# Patient Record
Sex: FEMALE | Race: WHITE | Employment: OTHER | URBAN - METROPOLITAN AREA
[De-identification: names, ages, dates, MRNs, and addresses within clinical notes are randomized per-mention and may not be internally consistent; named-entity substitution may affect disease eponyms.]

---

## 2018-12-14 RX ORDER — DIPHENOXYLATE HCL/ATROPINE 2.5-.025MG
1 TABLET ORAL EVERY 4 HOURS PRN
Status: ON HOLD | COMMUNITY
End: 2018-12-17

## 2018-12-14 RX ORDER — FLUTICASONE PROPIONATE 50 MCG
2 SPRAY, SUSPENSION (ML) NASAL DAILY PRN
Status: ON HOLD | COMMUNITY
End: 2019-09-03

## 2018-12-17 ENCOUNTER — ANESTHESIA (OUTPATIENT)
Dept: SURGERY | Facility: CLINIC | Age: 68
DRG: 742 | End: 2018-12-17
Payer: MEDICARE

## 2018-12-17 ENCOUNTER — HOSPITAL ENCOUNTER (INPATIENT)
Facility: CLINIC | Age: 68
LOS: 1 days | Discharge: HOME | DRG: 742 | End: 2018-12-18
Attending: COLON & RECTAL SURGERY | Admitting: COLON & RECTAL SURGERY
Payer: MEDICARE

## 2018-12-17 ENCOUNTER — ANESTHESIA EVENT (OUTPATIENT)
Dept: SURGERY | Facility: CLINIC | Age: 68
DRG: 742 | End: 2018-12-17
Payer: MEDICARE

## 2018-12-17 DIAGNOSIS — K46.9 ENTEROCELE: Primary | ICD-10-CM

## 2018-12-17 LAB
ABO + RH BLD: NORMAL
ABO + RH BLD: NORMAL
ANION GAP SERPL CALCULATED.3IONS-SCNC: 11 MMOL/L (ref 3–14)
BLD GP AB SCN SERPL QL: NORMAL
BLOOD BANK CMNT PATIENT-IMP: NORMAL
BUN SERPL-MCNC: 20 MG/DL (ref 7–30)
CALCIUM SERPL-MCNC: 9.2 MG/DL (ref 8.5–10.1)
CHLORIDE SERPL-SCNC: 98 MMOL/L (ref 94–109)
CO2 SERPL-SCNC: 23 MMOL/L (ref 20–32)
CREAT SERPL-MCNC: 0.72 MG/DL (ref 0.52–1.04)
ERYTHROCYTE [DISTWIDTH] IN BLOOD BY AUTOMATED COUNT: 12.7 % (ref 10–15)
GFR SERPL CREATININE-BSD FRML MDRD: 81 ML/MIN/1.7M2
GLUCOSE SERPL-MCNC: 82 MG/DL (ref 70–99)
HCT VFR BLD AUTO: 41 % (ref 35–47)
HGB BLD-MCNC: 14.3 G/DL (ref 11.7–15.7)
MCH RBC QN AUTO: 30.3 PG (ref 26.5–33)
MCHC RBC AUTO-ENTMCNC: 34.9 G/DL (ref 31.5–36.5)
MCV RBC AUTO: 87 FL (ref 78–100)
PLATELET # BLD AUTO: 351 10E9/L (ref 150–450)
POTASSIUM SERPL-SCNC: 3.4 MMOL/L (ref 3.4–5.3)
RBC # BLD AUTO: 4.72 10E12/L (ref 3.8–5.2)
SODIUM SERPL-SCNC: 132 MMOL/L (ref 133–144)
SPECIMEN EXP DATE BLD: NORMAL
WBC # BLD AUTO: 4.9 10E9/L (ref 4–11)

## 2018-12-17 PROCEDURE — 36415 COLL VENOUS BLD VENIPUNCTURE: CPT | Performed by: SURGERY

## 2018-12-17 PROCEDURE — 25000128 H RX IP 250 OP 636: Performed by: SURGERY

## 2018-12-17 PROCEDURE — 71000012 ZZH RECOVERY PHASE 1 LEVEL 1 FIRST HR: Performed by: COLON & RECTAL SURGERY

## 2018-12-17 PROCEDURE — 85027 COMPLETE CBC AUTOMATED: CPT | Performed by: COLON & RECTAL SURGERY

## 2018-12-17 PROCEDURE — 88305 TISSUE EXAM BY PATHOLOGIST: CPT | Performed by: OBSTETRICS & GYNECOLOGY

## 2018-12-17 PROCEDURE — 40000170 ZZH STATISTIC PRE-PROCEDURE ASSESSMENT II: Performed by: COLON & RECTAL SURGERY

## 2018-12-17 PROCEDURE — 0TJB8ZZ INSPECTION OF BLADDER, VIA NATURAL OR ARTIFICIAL OPENING ENDOSCOPIC: ICD-10-PCS | Performed by: OBSTETRICS & GYNECOLOGY

## 2018-12-17 PROCEDURE — 0JUC3JZ SUPPLEMENT OF PELVIC REGION SUBCUTANEOUS TISSUE AND FASCIA WITH SYNTHETIC SUBSTITUTE, PERCUTANEOUS APPROACH: ICD-10-PCS | Performed by: COLON & RECTAL SURGERY

## 2018-12-17 PROCEDURE — 86850 RBC ANTIBODY SCREEN: CPT | Performed by: SURGERY

## 2018-12-17 PROCEDURE — 12000007 ZZH R&B INTERMEDIATE

## 2018-12-17 PROCEDURE — 86900 BLOOD TYPING SEROLOGIC ABO: CPT | Performed by: SURGERY

## 2018-12-17 PROCEDURE — 27210794 ZZH OR GENERAL SUPPLY STERILE: Performed by: COLON & RECTAL SURGERY

## 2018-12-17 PROCEDURE — 25000128 H RX IP 250 OP 636: Performed by: NURSE ANESTHETIST, CERTIFIED REGISTERED

## 2018-12-17 PROCEDURE — 25000125 ZZHC RX 250: Performed by: NURSE ANESTHETIST, CERTIFIED REGISTERED

## 2018-12-17 PROCEDURE — A9270 NON-COVERED ITEM OR SERVICE: HCPCS | Mod: GY | Performed by: COLON & RECTAL SURGERY

## 2018-12-17 PROCEDURE — 37000009 ZZH ANESTHESIA TECHNICAL FEE, EACH ADDTL 15 MIN: Performed by: COLON & RECTAL SURGERY

## 2018-12-17 PROCEDURE — 27210995 ZZH RX 272: Performed by: COLON & RECTAL SURGERY

## 2018-12-17 PROCEDURE — C1781 MESH (IMPLANTABLE): HCPCS | Performed by: COLON & RECTAL SURGERY

## 2018-12-17 PROCEDURE — 86901 BLOOD TYPING SEROLOGIC RH(D): CPT | Performed by: SURGERY

## 2018-12-17 PROCEDURE — 80048 BASIC METABOLIC PNL TOTAL CA: CPT | Performed by: COLON & RECTAL SURGERY

## 2018-12-17 PROCEDURE — 36000087 ZZH SURGERY LEVEL 8 EA 15 ADDTL MIN: Performed by: COLON & RECTAL SURGERY

## 2018-12-17 PROCEDURE — 25800025 ZZH RX 258: Performed by: COLON & RECTAL SURGERY

## 2018-12-17 PROCEDURE — 25000128 H RX IP 250 OP 636: Performed by: COLON & RECTAL SURGERY

## 2018-12-17 PROCEDURE — 37000008 ZZH ANESTHESIA TECHNICAL FEE, 1ST 30 MIN: Performed by: COLON & RECTAL SURGERY

## 2018-12-17 PROCEDURE — 25000125 ZZHC RX 250: Performed by: SURGERY

## 2018-12-17 PROCEDURE — 25000125 ZZHC RX 250: Performed by: COLON & RECTAL SURGERY

## 2018-12-17 PROCEDURE — 36000085 ZZH SURGERY LEVEL 8 1ST 30 MIN: Performed by: COLON & RECTAL SURGERY

## 2018-12-17 PROCEDURE — 0UT24ZZ RESECTION OF BILATERAL OVARIES, PERCUTANEOUS ENDOSCOPIC APPROACH: ICD-10-PCS | Performed by: OBSTETRICS & GYNECOLOGY

## 2018-12-17 PROCEDURE — 88305 TISSUE EXAM BY PATHOLOGIST: CPT | Mod: 26 | Performed by: OBSTETRICS & GYNECOLOGY

## 2018-12-17 PROCEDURE — 8E0W4CZ ROBOTIC ASSISTED PROCEDURE OF TRUNK REGION, PERCUTANEOUS ENDOSCOPIC APPROACH: ICD-10-PCS | Performed by: COLON & RECTAL SURGERY

## 2018-12-17 PROCEDURE — 0DUP4JZ SUPPLEMENT RECTUM WITH SYNTHETIC SUBSTITUTE, PERCUTANEOUS ENDOSCOPIC APPROACH: ICD-10-PCS | Performed by: COLON & RECTAL SURGERY

## 2018-12-17 PROCEDURE — 25000132 ZZH RX MED GY IP 250 OP 250 PS 637: Mod: GY | Performed by: COLON & RECTAL SURGERY

## 2018-12-17 PROCEDURE — 25000566 ZZH SEVOFLURANE, EA 15 MIN: Performed by: COLON & RECTAL SURGERY

## 2018-12-17 PROCEDURE — 0USG4ZZ REPOSITION VAGINA, PERCUTANEOUS ENDOSCOPIC APPROACH: ICD-10-PCS | Performed by: OBSTETRICS & GYNECOLOGY

## 2018-12-17 PROCEDURE — 0UT74ZZ RESECTION OF BILATERAL FALLOPIAN TUBES, PERCUTANEOUS ENDOSCOPIC APPROACH: ICD-10-PCS | Performed by: OBSTETRICS & GYNECOLOGY

## 2018-12-17 DEVICE — MESH SLING ARTISYN SACROCOLPOPEXY ARTY5L: Type: IMPLANTABLE DEVICE | Site: PELVIS | Status: FUNCTIONAL

## 2018-12-17 RX ORDER — ONDANSETRON 2 MG/ML
4 INJECTION INTRAMUSCULAR; INTRAVENOUS EVERY 6 HOURS PRN
Status: DISCONTINUED | OUTPATIENT
Start: 2018-12-17 | End: 2018-12-18 | Stop reason: HOSPADM

## 2018-12-17 RX ORDER — HYDRALAZINE HYDROCHLORIDE 20 MG/ML
10 INJECTION INTRAMUSCULAR; INTRAVENOUS EVERY 4 HOURS PRN
Status: DISCONTINUED | OUTPATIENT
Start: 2018-12-17 | End: 2018-12-18 | Stop reason: HOSPADM

## 2018-12-17 RX ORDER — METOPROLOL TARTRATE 1 MG/ML
1-2 INJECTION, SOLUTION INTRAVENOUS EVERY 5 MIN PRN
Status: DISCONTINUED | OUTPATIENT
Start: 2018-12-17 | End: 2018-12-17 | Stop reason: HOSPADM

## 2018-12-17 RX ORDER — FENTANYL CITRATE 50 UG/ML
INJECTION, SOLUTION INTRAMUSCULAR; INTRAVENOUS PRN
Status: DISCONTINUED | OUTPATIENT
Start: 2018-12-17 | End: 2018-12-17

## 2018-12-17 RX ORDER — NALOXONE HYDROCHLORIDE 0.4 MG/ML
.1-.4 INJECTION, SOLUTION INTRAMUSCULAR; INTRAVENOUS; SUBCUTANEOUS
Status: DISCONTINUED | OUTPATIENT
Start: 2018-12-17 | End: 2018-12-17

## 2018-12-17 RX ORDER — GABAPENTIN 600 MG/1
600 TABLET ORAL 2 TIMES DAILY
Status: DISCONTINUED | OUTPATIENT
Start: 2018-12-17 | End: 2018-12-18 | Stop reason: HOSPADM

## 2018-12-17 RX ORDER — ACETAMINOPHEN 325 MG/1
975 TABLET ORAL EVERY 8 HOURS
Status: DISCONTINUED | OUTPATIENT
Start: 2018-12-17 | End: 2018-12-18 | Stop reason: HOSPADM

## 2018-12-17 RX ORDER — NEOSTIGMINE METHYLSULFATE 1 MG/ML
VIAL (ML) INJECTION PRN
Status: DISCONTINUED | OUTPATIENT
Start: 2018-12-17 | End: 2018-12-17

## 2018-12-17 RX ORDER — CEFAZOLIN SODIUM 1 G/3ML
1 INJECTION, POWDER, FOR SOLUTION INTRAMUSCULAR; INTRAVENOUS SEE ADMIN INSTRUCTIONS
Status: DISCONTINUED | OUTPATIENT
Start: 2018-12-17 | End: 2018-12-17 | Stop reason: ALTCHOICE

## 2018-12-17 RX ORDER — DEXTROSE MONOHYDRATE, SODIUM CHLORIDE, AND POTASSIUM CHLORIDE 50; 1.49; 4.5 G/1000ML; G/1000ML; G/1000ML
INJECTION, SOLUTION INTRAVENOUS CONTINUOUS
Status: DISCONTINUED | OUTPATIENT
Start: 2018-12-17 | End: 2018-12-18

## 2018-12-17 RX ORDER — ONDANSETRON 2 MG/ML
INJECTION INTRAMUSCULAR; INTRAVENOUS PRN
Status: DISCONTINUED | OUTPATIENT
Start: 2018-12-17 | End: 2018-12-17

## 2018-12-17 RX ORDER — PIPERACILLIN SODIUM, TAZOBACTAM SODIUM 3; .375 G/15ML; G/15ML
3.38 INJECTION, POWDER, LYOPHILIZED, FOR SOLUTION INTRAVENOUS
Status: COMPLETED | OUTPATIENT
Start: 2018-12-17 | End: 2018-12-17

## 2018-12-17 RX ORDER — MAGNESIUM HYDROXIDE 1200 MG/15ML
LIQUID ORAL PRN
Status: DISCONTINUED | OUTPATIENT
Start: 2018-12-17 | End: 2018-12-17 | Stop reason: HOSPADM

## 2018-12-17 RX ORDER — ONDANSETRON 2 MG/ML
4 INJECTION INTRAMUSCULAR; INTRAVENOUS EVERY 30 MIN PRN
Status: DISCONTINUED | OUTPATIENT
Start: 2018-12-17 | End: 2018-12-17 | Stop reason: HOSPADM

## 2018-12-17 RX ORDER — MEPERIDINE HYDROCHLORIDE 25 MG/ML
12.5 INJECTION INTRAMUSCULAR; INTRAVENOUS; SUBCUTANEOUS EVERY 5 MIN PRN
Status: DISCONTINUED | OUTPATIENT
Start: 2018-12-17 | End: 2018-12-17 | Stop reason: HOSPADM

## 2018-12-17 RX ORDER — HYDROMORPHONE HYDROCHLORIDE 1 MG/ML
.3-.5 INJECTION, SOLUTION INTRAMUSCULAR; INTRAVENOUS; SUBCUTANEOUS
Status: DISCONTINUED | OUTPATIENT
Start: 2018-12-17 | End: 2018-12-18 | Stop reason: HOSPADM

## 2018-12-17 RX ORDER — CEFAZOLIN SODIUM 1 G/3ML
1 INJECTION, POWDER, FOR SOLUTION INTRAMUSCULAR; INTRAVENOUS EVERY 8 HOURS
Status: COMPLETED | OUTPATIENT
Start: 2018-12-17 | End: 2018-12-18

## 2018-12-17 RX ORDER — SODIUM CHLORIDE, SODIUM LACTATE, POTASSIUM CHLORIDE, CALCIUM CHLORIDE 600; 310; 30; 20 MG/100ML; MG/100ML; MG/100ML; MG/100ML
INJECTION, SOLUTION INTRAVENOUS CONTINUOUS
Status: DISCONTINUED | OUTPATIENT
Start: 2018-12-17 | End: 2018-12-17 | Stop reason: HOSPADM

## 2018-12-17 RX ORDER — DEXAMETHASONE SODIUM PHOSPHATE 4 MG/ML
INJECTION, SOLUTION INTRA-ARTICULAR; INTRALESIONAL; INTRAMUSCULAR; INTRAVENOUS; SOFT TISSUE PRN
Status: DISCONTINUED | OUTPATIENT
Start: 2018-12-17 | End: 2018-12-17

## 2018-12-17 RX ORDER — ACETAMINOPHEN 325 MG/1
975 TABLET ORAL ONCE
Status: COMPLETED | OUTPATIENT
Start: 2018-12-17 | End: 2018-12-17

## 2018-12-17 RX ORDER — KETOROLAC TROMETHAMINE 15 MG/ML
15 INJECTION, SOLUTION INTRAMUSCULAR; INTRAVENOUS EVERY 6 HOURS
Status: DISCONTINUED | OUTPATIENT
Start: 2018-12-17 | End: 2018-12-18 | Stop reason: HOSPADM

## 2018-12-17 RX ORDER — NALOXONE HYDROCHLORIDE 0.4 MG/ML
.1-.4 INJECTION, SOLUTION INTRAMUSCULAR; INTRAVENOUS; SUBCUTANEOUS
Status: DISCONTINUED | OUTPATIENT
Start: 2018-12-17 | End: 2018-12-18 | Stop reason: HOSPADM

## 2018-12-17 RX ORDER — ALBUTEROL SULFATE 90 UG/1
2 AEROSOL, METERED RESPIRATORY (INHALATION) DAILY PRN
Status: DISCONTINUED | OUTPATIENT
Start: 2018-12-17 | End: 2018-12-18 | Stop reason: HOSPADM

## 2018-12-17 RX ORDER — VECURONIUM BROMIDE 1 MG/ML
INJECTION, POWDER, LYOPHILIZED, FOR SOLUTION INTRAVENOUS PRN
Status: DISCONTINUED | OUTPATIENT
Start: 2018-12-17 | End: 2018-12-17

## 2018-12-17 RX ORDER — LIDOCAINE HYDROCHLORIDE 20 MG/ML
INJECTION, SOLUTION INFILTRATION; PERINEURAL PRN
Status: DISCONTINUED | OUTPATIENT
Start: 2018-12-17 | End: 2018-12-17

## 2018-12-17 RX ORDER — PROPOFOL 10 MG/ML
INJECTION, EMULSION INTRAVENOUS PRN
Status: DISCONTINUED | OUTPATIENT
Start: 2018-12-17 | End: 2018-12-17

## 2018-12-17 RX ORDER — FLUTICASONE PROPIONATE 50 MCG
2 SPRAY, SUSPENSION (ML) NASAL DAILY PRN
Status: DISCONTINUED | OUTPATIENT
Start: 2018-12-17 | End: 2018-12-18 | Stop reason: HOSPADM

## 2018-12-17 RX ORDER — LIDOCAINE 40 MG/G
CREAM TOPICAL
Status: DISCONTINUED | OUTPATIENT
Start: 2018-12-17 | End: 2018-12-18 | Stop reason: HOSPADM

## 2018-12-17 RX ORDER — PROPOFOL 10 MG/ML
INJECTION, EMULSION INTRAVENOUS CONTINUOUS PRN
Status: DISCONTINUED | OUTPATIENT
Start: 2018-12-17 | End: 2018-12-17

## 2018-12-17 RX ORDER — EPHEDRINE SULFATE 50 MG/ML
INJECTION, SOLUTION INTRAMUSCULAR; INTRAVENOUS; SUBCUTANEOUS PRN
Status: DISCONTINUED | OUTPATIENT
Start: 2018-12-17 | End: 2018-12-17

## 2018-12-17 RX ORDER — FENTANYL CITRATE 50 UG/ML
25-50 INJECTION, SOLUTION INTRAMUSCULAR; INTRAVENOUS
Status: DISCONTINUED | OUTPATIENT
Start: 2018-12-17 | End: 2018-12-17 | Stop reason: HOSPADM

## 2018-12-17 RX ORDER — HYDROMORPHONE HYDROCHLORIDE 1 MG/ML
.3-.5 INJECTION, SOLUTION INTRAMUSCULAR; INTRAVENOUS; SUBCUTANEOUS EVERY 5 MIN PRN
Status: DISCONTINUED | OUTPATIENT
Start: 2018-12-17 | End: 2018-12-17 | Stop reason: HOSPADM

## 2018-12-17 RX ORDER — GLYCOPYRROLATE 0.2 MG/ML
INJECTION, SOLUTION INTRAMUSCULAR; INTRAVENOUS PRN
Status: DISCONTINUED | OUTPATIENT
Start: 2018-12-17 | End: 2018-12-17

## 2018-12-17 RX ORDER — METOPROLOL TARTRATE 25 MG/1
25 TABLET, FILM COATED ORAL 2 TIMES DAILY
Status: DISCONTINUED | OUTPATIENT
Start: 2018-12-18 | End: 2018-12-18 | Stop reason: HOSPADM

## 2018-12-17 RX ORDER — CEFAZOLIN SODIUM 2 G/100ML
2 INJECTION, SOLUTION INTRAVENOUS
Status: DISCONTINUED | OUTPATIENT
Start: 2018-12-17 | End: 2018-12-17 | Stop reason: ALTCHOICE

## 2018-12-17 RX ORDER — BUPIVACAINE HYDROCHLORIDE AND EPINEPHRINE 5; 5 MG/ML; UG/ML
INJECTION, SOLUTION PERINEURAL PRN
Status: DISCONTINUED | OUTPATIENT
Start: 2018-12-17 | End: 2018-12-17 | Stop reason: HOSPADM

## 2018-12-17 RX ORDER — ONDANSETRON 4 MG/1
4 TABLET, ORALLY DISINTEGRATING ORAL EVERY 30 MIN PRN
Status: DISCONTINUED | OUTPATIENT
Start: 2018-12-17 | End: 2018-12-17 | Stop reason: HOSPADM

## 2018-12-17 RX ORDER — ALBUTEROL SULFATE 90 UG/1
2 AEROSOL, METERED RESPIRATORY (INHALATION) DAILY PRN
COMMUNITY

## 2018-12-17 RX ADMIN — FENTANYL CITRATE 50 MCG: 50 INJECTION, SOLUTION INTRAMUSCULAR; INTRAVENOUS at 17:00

## 2018-12-17 RX ADMIN — CEFAZOLIN SODIUM 1 G: 1 INJECTION, POWDER, FOR SOLUTION INTRAMUSCULAR; INTRAVENOUS at 21:36

## 2018-12-17 RX ADMIN — FENTANYL CITRATE 100 MCG: 50 INJECTION, SOLUTION INTRAMUSCULAR; INTRAVENOUS at 14:01

## 2018-12-17 RX ADMIN — PROPOFOL 50 MCG/KG/MIN: 10 INJECTION, EMULSION INTRAVENOUS at 14:04

## 2018-12-17 RX ADMIN — ONDANSETRON 4 MG: 2 INJECTION INTRAMUSCULAR; INTRAVENOUS at 15:55

## 2018-12-17 RX ADMIN — KETOROLAC TROMETHAMINE 15 MG: 15 INJECTION, SOLUTION INTRAMUSCULAR; INTRAVENOUS at 23:42

## 2018-12-17 RX ADMIN — LIDOCAINE HYDROCHLORIDE 0.2 ML: 10 INJECTION, SOLUTION EPIDURAL; INFILTRATION; INTRACAUDAL; PERINEURAL at 11:53

## 2018-12-17 RX ADMIN — MIDAZOLAM 2 MG: 1 INJECTION INTRAMUSCULAR; INTRAVENOUS at 13:54

## 2018-12-17 RX ADMIN — ROCURONIUM BROMIDE 50 MG: 10 INJECTION INTRAVENOUS at 14:01

## 2018-12-17 RX ADMIN — KETOROLAC TROMETHAMINE 15 MG: 15 INJECTION, SOLUTION INTRAMUSCULAR; INTRAVENOUS at 16:37

## 2018-12-17 RX ADMIN — ACETAMINOPHEN 975 MG: 325 TABLET, FILM COATED ORAL at 21:37

## 2018-12-17 RX ADMIN — PROPOFOL 150 MG: 10 INJECTION, EMULSION INTRAVENOUS at 14:01

## 2018-12-17 RX ADMIN — DEXMEDETOMIDINE HYDROCHLORIDE 10 MCG: 100 INJECTION, SOLUTION INTRAVENOUS at 14:46

## 2018-12-17 RX ADMIN — NEOSTIGMINE METHYLSULFATE 3.5 MG: 1 INJECTION, SOLUTION INTRAVENOUS at 16:08

## 2018-12-17 RX ADMIN — LIDOCAINE HYDROCHLORIDE 60 MG: 20 INJECTION, SOLUTION INFILTRATION; PERINEURAL at 14:01

## 2018-12-17 RX ADMIN — FENTANYL CITRATE 50 MCG: 50 INJECTION, SOLUTION INTRAMUSCULAR; INTRAVENOUS at 16:46

## 2018-12-17 RX ADMIN — Medication 0.5 MG: at 16:45

## 2018-12-17 RX ADMIN — SODIUM CHLORIDE, POTASSIUM CHLORIDE, SODIUM LACTATE AND CALCIUM CHLORIDE: 600; 310; 30; 20 INJECTION, SOLUTION INTRAVENOUS at 11:53

## 2018-12-17 RX ADMIN — POTASSIUM CHLORIDE, DEXTROSE MONOHYDRATE AND SODIUM CHLORIDE: 150; 5; 450 INJECTION, SOLUTION INTRAVENOUS at 18:09

## 2018-12-17 RX ADMIN — Medication 7.5 MG: at 14:10

## 2018-12-17 RX ADMIN — Medication 0.5 MG: at 16:37

## 2018-12-17 RX ADMIN — DEXAMETHASONE SODIUM PHOSPHATE 4 MG: 4 INJECTION, SOLUTION INTRA-ARTICULAR; INTRALESIONAL; INTRAMUSCULAR; INTRAVENOUS; SOFT TISSUE at 14:16

## 2018-12-17 RX ADMIN — GLYCOPYRROLATE 0.6 MG: 0.2 INJECTION, SOLUTION INTRAMUSCULAR; INTRAVENOUS at 16:08

## 2018-12-17 RX ADMIN — Medication 5 MG: at 14:22

## 2018-12-17 RX ADMIN — Medication 7.5 MG: at 14:05

## 2018-12-17 RX ADMIN — ACETAMINOPHEN 975 MG: 325 TABLET, FILM COATED ORAL at 11:47

## 2018-12-17 RX ADMIN — GABAPENTIN 600 MG: 600 TABLET, FILM COATED ORAL at 21:37

## 2018-12-17 RX ADMIN — SODIUM CHLORIDE, POTASSIUM CHLORIDE, SODIUM LACTATE AND CALCIUM CHLORIDE: 600; 310; 30; 20 INJECTION, SOLUTION INTRAVENOUS at 15:28

## 2018-12-17 RX ADMIN — FENTANYL CITRATE 50 MCG: 50 INJECTION, SOLUTION INTRAMUSCULAR; INTRAVENOUS at 14:35

## 2018-12-17 RX ADMIN — VECURONIUM BROMIDE 1 MG: 1 INJECTION, POWDER, LYOPHILIZED, FOR SOLUTION INTRAVENOUS at 15:15

## 2018-12-17 RX ADMIN — PIPERACILLIN SODIUM AND TAZOBACTAM SODIUM 3.38 G: 3; .375 INJECTION, POWDER, LYOPHILIZED, FOR SOLUTION INTRAVENOUS at 14:15

## 2018-12-17 ASSESSMENT — MIFFLIN-ST. JEOR: SCORE: 1291.11

## 2018-12-17 ASSESSMENT — ACTIVITIES OF DAILY LIVING (ADL): ADLS_ACUITY_SCORE: 11

## 2018-12-17 ASSESSMENT — ENCOUNTER SYMPTOMS: ORTHOPNEA: 0

## 2018-12-17 NOTE — BRIEF OP NOTE
[facility]    Brief Operative Note    Pre-operative diagnosis: PELVIC ORGAN PROLAPSE: rectal intussusception, enterocele, rectocele, cystocele  Post-operative diagnosis PELVIC ORGAN PROLAPSE: rectal intussusception, enterocele, rectocele, cystocele  Procedure:   1. Robotic ventral rectopexy  2. Robotic sacrocolpopexy  3. Robotic bilateral salpingectomy and oophorectomy  4. cystoscopy  Surgeon: Surgeon(s) and Role:  Panel 1:     * Kristina Martin MD - Primary     * Rebecca Bartlett PA-C - Assisting  Panel 2:     * Maurice Minor MD - Primary     * Kristina Craig PA-C - Assisting  Anesthesia: General   Estimated blood loss: 10 ml  Drains: None  Specimens:   ID Type Source Tests Collected by Time Destination   A :  Organ Fallopian Tube and Ovary, Right SURGICAL PATHOLOGY EXAM Maurice Minor MD 12/17/2018  2:43 PM    B :  Organ Fallopian Tube and Ovary, Left SURGICAL PATHOLOGY EXAM Maurice Minor MD 12/17/2018  2:47 PM      Findings:   rectal width 4.5 cm at the levators.  Complications: None.  Implants: mesh      Kristina Martin MD  [facility]  Pager:  [phone]  Phone: [phone]  Fax: [phone]            ADDENDUM:    PATIENT DATA  Indicate Y or N:  Home O2 No  Hemodialysis  No  Transplant patient  No  Cirrhosis  No  Steroids in last 30 days  No  Immunomodulators in last 30 days  No  Anticoagulation at time of surgery  No   List medication na  Prior abdominal surgery  Yes  Pelvic irradiation  No    Albumin within 30 days if known unknown   Hgb within 30 days if known    Hemoglobin   Date Value Ref Range Status   12/17/2018 14.3 11.7 - 15.7 g/dL Final   ]  Cr within 30 days if known    Creatinine   Date Value Ref Range Status   12/17/2018 0.72 0.52 - 1.04 mg/dL Final   ]  Body mass index is 23.72 kg/m .      OR DATA  Emergent  No   <24 hours  No   <1 week  No  Bowel Prep Yes  Antibiotics  Yes  DVT prophylaxis    Heparin  No   SCD   Yes   None  No  Drain  No  ASA (1,2,3,4) 2  OR time (min) 103  Stents  No  Transfuse >/= 2U  No  Anastomosis none

## 2018-12-17 NOTE — ANESTHESIA CARE TRANSFER NOTE
Patient: Henna Chaconverson    Procedure(s):  ROBOTIC ASSISTED XI VENTRAL RECTOPEXY  DAVINCI XI SACROCOLPOPEXY, CYSTOSCOPY, HYDRODISTENTION  DAVINCI XI BILATERAL SALPINGO-OOPHORECTOMY    Diagnosis: PELVIC ORGAN PROLAPSE  Diagnosis Additional Information: No value filed.    Anesthesia Type:   General, ETT     Note:  Airway :Face Mask  Patient transferred to:PACU  Handoff Report: Identifed the Patient, Identified the Reponsible Provider, Reviewed the pertinent medical history, Discussed the surgical course, Reviewed Intra-OP anesthesia mangement and issues during anesthesia, Set expectations for post-procedure period and Allowed opportunity for questions and acknowledgement of understanding      Vitals: (Last set prior to Anesthesia Care Transfer)    CRNA VITALS  12/17/2018 1548 - 12/17/2018 1623      12/17/2018             Pulse:  86    SpO2:  97 %    Resp Rate (set):  10                Electronically Signed By: QUINCY Chowdhury CRNA  December 17, 2018  4:23 PM

## 2018-12-17 NOTE — ANESTHESIA PREPROCEDURE EVALUATION
Anesthesia Pre-Procedure Evaluation    Patient: Henna Li   MRN: [id number] : 1950          Preoperative Diagnosis: PELVIC ORGAN PROLAPSE    Procedure(s):  ROBOTIC ASSISTED XI VENTRAL RECTOPEXY  DAVINCI XI SACROCOLPOPEXY, BILATERAL SALPINGO-OOPHORECTOMY, CYSTOCOPY HYDRODISTENTION, POSSIBLE ADVANTAGE FIT SLING,  DAVINCI XI SALPINGO-OOPHORECTOMY INCLUDING BILATERAL (SEE NOTE ON PANEL ONE)    Past Medical History:   Diagnosis Date     Allergic rhinitis      Arthritis      Depression      Dyspareunia, female      Hypertension      Internal intussusception of rectum (H)      Interstitial cystitis      Irritable bowel syndrome with diarrhea      Neuropathy of lower extremity, unspecified laterality     bilat.     Osteoarthritis      Osteopenia      Prolapse of vaginal wall      Rectocele      Spinal stenosis      Takotsubo cardiomyopathy 2013    resolved     Past Surgical History:   Procedure Laterality Date     BREAST SURGERY      augmentation     COSMETIC SURGERY  2017    facelift, blepharoplasty     ENT SURGERY      tonsilectomy     GYN SURGERY      perineoplasty, mesh placement for vaginal prolapse     GYN SURGERY      hysterectomy     ORTHOPEDIC SURGERY      neuroma,hammertoes     Procedure: Procedure(s):  ROBOTIC ASSISTED XI VENTRAL RECTOPEXY  DAVINCI XI SACROCOLPOPEXY, BILATERAL SALPINGO-OOPHORECTOMY, CYSTOCOPY HYDRODISTENTION, POSSIBLE ADVANTAGE FIT SLING,  DAVINCI XI SALPINGO-OOPHORECTOMY INCLUDING BILATERAL (SEE NOTE ON PANEL ONE)  Preop diagnosis: PELVIC ORGAN PROLAPSE    Allergies   Allergen Reactions     Azithromycin      gastrointestinal     Sulfa Drugs GI Disturbance     Syncope       Tetracyclines GI Disturbance     Past Medical History:   Diagnosis Date     Allergic rhinitis      Arthritis      Depression      Dyspareunia, female      Hypertension      Internal intussusception of rectum (H)      Interstitial cystitis      Irritable bowel syndrome with diarrhea      Neuropathy of lower  extremity, unspecified laterality     bilat.     Osteoarthritis      Osteopenia      Prolapse of vaginal wall      Rectocele      Spinal stenosis      Takotsubo cardiomyopathy 2013    resolved     Past Surgical History:   Procedure Laterality Date     BREAST SURGERY      augmentation     COSMETIC SURGERY  2017    facelift, blepharoplasty     ENT SURGERY      tonsilectomy     GYN SURGERY      perineoplasty, mesh placement for vaginal prolapse     GYN SURGERY  2010    hysterectomy     ORTHOPEDIC SURGERY      neuroma,sarmad     Social History     Tobacco Use     Smoking status: Former Smoker     Last attempt to quit: 2016     Years since quittin.9     Smokeless tobacco: Never Used   Substance Use Topics     Alcohol use: Yes     Comment: occcas 1-2/ week     Prior to Admission medications    Medication Sig Start Date End Date Taking? Authorizing Provider   albuterol (PROAIR HFA/PROVENTIL HFA/VENTOLIN HFA) 108 (90 Base) MCG/ACT inhaler Inhale 2 puffs into the lungs daily as needed for shortness of breath / dyspnea or wheezing   Yes Reported, Patient   Ascorbic Acid (VITAMIN C ADULT GUMMIES) 125 MG CHEW Take 1-2 chew tab by mouth daily   Yes Reported, Patient   ASPIRIN PO Take 81 mg by mouth daily   Yes Reported, Patient   Cholecalciferol (VITAMIN D PO) Take 2 tablets by mouth daily   Yes Reported, Patient   conjugated estrogens (PREMARIN) 0.625 MG/GM vaginal cream Place vaginally once a week On Saturday.   Yes Reported, Patient   fluticasone (FLONASE) 50 MCG/ACT nasal spray Spray 2 sprays into both nostrils daily   Yes Reported, Patient   GABAPENTIN PO Take 600 mg by mouth 2 times daily In the morning and at bedtime.   Yes Reported, Patient   HYDROCHLOROTHIAZIDE PO Take 12.5 mg by mouth daily   Yes Reported, Patient   IBUPROFEN PO Take 600 mg by mouth 3 times daily as needed for moderate pain    Yes Reported, Patient   METOPROLOL SUCCINATE PO Take 25 mg by mouth 2 times daily   Yes Reported, Patient   Multiple  Vitamins-Minerals (MULTIVITAMIN ADULT PO) Take 1 tablet by mouth daily   Yes Reported, Patient   Naphazoline-Pheniramine (NAPHCON-A OP) Place 2 drops into both eyes daily   Yes Reported, Patient   Omeprazole (PRILOSEC PO) Take 20 mg by mouth daily    Yes Reported, Patient   Pentosan Polysulfate Sodium (ELMIRON PO) Take 100 mg by mouth 2 times daily In the morning and at bedtime.   Yes Reported, Patient   Sertraline HCl (ZOLOFT PO) Take 150 mg by mouth daily (Takes 1.5 x 100mg tablet = 150mg dose)   Yes Reported, Patient     Current Facility-Administered Medications Ordered in Epic   Medication Dose Route Frequency Last Rate Last Dose     lactated ringers infusion   Intravenous Continuous 25 mL/hr at 12/17/18 1153       lidocaine 1 % 1 mL  1 mL Other Q1H PRN   0.2 mL at 12/17/18 1153     piperacillin-tazobactam (ZOSYN) 3.375 g vial to attach to  mL bag  3.375 g Intravenous Pre-Op/Pre-procedure x 1 dose         Provider ordered ALTERNATE pre op antibiotic.  1 each As instructed Continuous         No current [facility]-ordered outpatient medications on file.       lactated ringers 25 mL/hr at 12/17/18 1153     Another Antibiotic has been ordered.       Wt Readings from Last 1 Encounters:   12/17/18 70.8 kg (156 lb)     Temp Readings from Last 1 Encounters:   12/17/18 37.3  C (99.2  F)     BP Readings from Last 6 Encounters:   12/17/18 130/65     Pulse Readings from Last 4 Encounters:   No data found for Pulse     Resp Readings from Last 1 Encounters:   12/17/18 16   @LASTSAO2(1)@  Recent Labs   Lab Test 12/17/18  1121   *   POTASSIUM 3.4   CHLORIDE 98   CO2 23   ANIONGAP 11   GLC 82   BUN 20   CR 0.72   REAL 9.2     No results for input(s): AST, ALT, ALKPHOS, BILITOTAL, LIPASE in the last 37319 hours.  Recent Labs   Lab Test 12/17/18  1121   WBC 4.9   HGB 14.3        Recent Labs   Lab Test 12/17/18  1121   ABO PENDING     No results for input(s): INR, PTT in the last 28123 hours.   No results for input(s):  "TROPI in the last 12320 hours.  No results for input(s): PH, PCO2, PO2, HCO3 in the last 70032 hours.  No results for input(s): HCG in the last 25867 hours.  No results found for this or any previous visit (from the past 744 hour(s)).    RECENT LABS:   ECG:   ECHO:       Anesthesia Evaluation     .             ROS/MED HX    ENT/Pulmonary:     (+)allergic rhinitis, , . .    Neurologic:     (+)neuropathy other neuro (spinal stenosis and spondylolithesis )     Cardiovascular: Comment: H/o Takotsubo CM in 2013 - started on metoprolol, Stress test at that time was negative    (+) hypertension----. : . . . :. .      (-) HANNON and orthopnea/PND   METS/Exercise Tolerance:  >4 METS   Hematologic:         Musculoskeletal:   (+) arthritis, , , -       GI/Hepatic:     (+) Other GI/Hepatic IBS      Renal/Genitourinary:         Endo:         Psychiatric:     (+) psychiatric history depression      Infectious Disease:         Malignancy:         Other:                          Physical Exam  Normal systems: dental    Airway   Mallampati: I  TM distance: >3 FB  Neck ROM: full    Dental     Cardiovascular   Rhythm and rate: regular and normal      Pulmonary    breath sounds clear to auscultation            Lab Results   Component Value Date    WBC 4.9 12/17/2018    HGB 14.3 12/17/2018    HCT 41.0 12/17/2018     12/17/2018       Preop Vitals  BP Readings from Last 3 Encounters:   12/17/18 130/65    Pulse Readings from Last 3 Encounters:   No data found for Pulse      Resp Readings from Last 3 Encounters:   12/17/18 16    SpO2 Readings from Last 3 Encounters:   12/17/18 96%      Temp Readings from Last 1 Encounters:   12/17/18 37.3  C (99.2  F)    Ht Readings from Last 1 Encounters:   12/17/18 1.727 m (5' 8\")      Wt Readings from Last 1 Encounters:   12/17/18 70.8 kg (156 lb)    Estimated body mass index is 23.72 kg/m  as calculated from the following:    Height as of this encounter: 1.727 m (5' 8\").    Weight as of this " encounter: 70.8 kg (156 lb).       Anesthesia Plan      History & Physical Review  History and physical reviewed and following examination; no interval change.    ASA Status:  2 .    NPO Status:  > 8 hours    Plan for General and ETT with Intravenous and Propofol induction. Maintenance will be Balanced.    PONV prophylaxis:  Ondansetron (or other 5HT-3) and Dexamethasone or Solumedrol  Additional equipment: 2nd IV Propofol gtt  hydromorphone      Postoperative Care  Postoperative pain management:  IV analgesics and Multi-modal analgesia.      Consents  Anesthetic plan, risks, benefits and alternatives discussed with:  Patient..                 Omari Grey MD

## 2018-12-18 VITALS
SYSTOLIC BLOOD PRESSURE: 124 MMHG | RESPIRATION RATE: 18 BRPM | TEMPERATURE: 98.2 F | HEART RATE: 72 BPM | DIASTOLIC BLOOD PRESSURE: 55 MMHG | WEIGHT: 156 LBS | OXYGEN SATURATION: 96 % | BODY MASS INDEX: 23.64 KG/M2 | HEIGHT: 68 IN

## 2018-12-18 LAB
ANION GAP SERPL CALCULATED.3IONS-SCNC: 7 MMOL/L (ref 3–14)
BUN SERPL-MCNC: 18 MG/DL (ref 7–30)
CALCIUM SERPL-MCNC: 7.6 MG/DL (ref 8.5–10.1)
CHLORIDE SERPL-SCNC: 99 MMOL/L (ref 94–109)
CO2 SERPL-SCNC: 26 MMOL/L (ref 20–32)
CREAT SERPL-MCNC: 0.73 MG/DL (ref 0.52–1.04)
ERYTHROCYTE [DISTWIDTH] IN BLOOD BY AUTOMATED COUNT: 12.8 % (ref 10–15)
GFR SERPL CREATININE-BSD FRML MDRD: 79 ML/MIN/1.7M2
GLUCOSE BLDC GLUCOMTR-MCNC: 96 MG/DL (ref 70–99)
GLUCOSE SERPL-MCNC: 116 MG/DL (ref 70–99)
HCT VFR BLD AUTO: 28.1 % (ref 35–47)
HGB BLD-MCNC: 9.6 G/DL (ref 11.7–15.7)
MAGNESIUM SERPL-MCNC: 1.6 MG/DL (ref 1.6–2.3)
MCH RBC QN AUTO: 29.8 PG (ref 26.5–33)
MCHC RBC AUTO-ENTMCNC: 34.2 G/DL (ref 31.5–36.5)
MCV RBC AUTO: 87 FL (ref 78–100)
PLATELET # BLD AUTO: 276 10E9/L (ref 150–450)
POTASSIUM SERPL-SCNC: 3.3 MMOL/L (ref 3.4–5.3)
RBC # BLD AUTO: 3.22 10E12/L (ref 3.8–5.2)
SODIUM SERPL-SCNC: 132 MMOL/L (ref 133–144)
WBC # BLD AUTO: 5.8 10E9/L (ref 4–11)

## 2018-12-18 PROCEDURE — 36415 COLL VENOUS BLD VENIPUNCTURE: CPT | Performed by: COLON & RECTAL SURGERY

## 2018-12-18 PROCEDURE — 25800025 ZZH RX 258: Performed by: COLON & RECTAL SURGERY

## 2018-12-18 PROCEDURE — A9270 NON-COVERED ITEM OR SERVICE: HCPCS | Mod: GY | Performed by: COLON & RECTAL SURGERY

## 2018-12-18 PROCEDURE — 25000125 ZZHC RX 250: Performed by: COLON & RECTAL SURGERY

## 2018-12-18 PROCEDURE — 25000128 H RX IP 250 OP 636: Performed by: OBSTETRICS & GYNECOLOGY

## 2018-12-18 PROCEDURE — 25000132 ZZH RX MED GY IP 250 OP 250 PS 637: Mod: GY | Performed by: COLON & RECTAL SURGERY

## 2018-12-18 PROCEDURE — 85027 COMPLETE CBC AUTOMATED: CPT | Performed by: COLON & RECTAL SURGERY

## 2018-12-18 PROCEDURE — 00000146 ZZHCL STATISTIC GLUCOSE BY METER IP

## 2018-12-18 PROCEDURE — 25000128 H RX IP 250 OP 636: Performed by: COLON & RECTAL SURGERY

## 2018-12-18 PROCEDURE — 83735 ASSAY OF MAGNESIUM: CPT | Performed by: COLON & RECTAL SURGERY

## 2018-12-18 PROCEDURE — 80048 BASIC METABOLIC PNL TOTAL CA: CPT | Performed by: COLON & RECTAL SURGERY

## 2018-12-18 RX ORDER — FUROSEMIDE 10 MG/ML
10 INJECTION INTRAMUSCULAR; INTRAVENOUS ONCE
Status: COMPLETED | OUTPATIENT
Start: 2018-12-18 | End: 2018-12-18

## 2018-12-18 RX ORDER — HYDROCODONE BITARTRATE AND ACETAMINOPHEN 5; 325 MG/1; MG/1
1 TABLET ORAL EVERY 6 HOURS PRN
Qty: 10 TABLET | Refills: 0 | Status: SHIPPED | OUTPATIENT
Start: 2018-12-18 | End: 2018-12-21

## 2018-12-18 RX ADMIN — HYDROMORPHONE HYDROCHLORIDE 0.3 MG: 1 INJECTION, SOLUTION INTRAMUSCULAR; INTRAVENOUS; SUBCUTANEOUS at 00:31

## 2018-12-18 RX ADMIN — HYDROMORPHONE HYDROCHLORIDE 0.4 MG: 1 INJECTION, SOLUTION INTRAMUSCULAR; INTRAVENOUS; SUBCUTANEOUS at 09:28

## 2018-12-18 RX ADMIN — ACETAMINOPHEN 975 MG: 325 TABLET, FILM COATED ORAL at 14:16

## 2018-12-18 RX ADMIN — HYDROMORPHONE HYDROCHLORIDE 0.5 MG: 1 INJECTION, SOLUTION INTRAMUSCULAR; INTRAVENOUS; SUBCUTANEOUS at 04:25

## 2018-12-18 RX ADMIN — HYDROMORPHONE HYDROCHLORIDE 0.4 MG: 1 INJECTION, SOLUTION INTRAMUSCULAR; INTRAVENOUS; SUBCUTANEOUS at 06:53

## 2018-12-18 RX ADMIN — OMEPRAZOLE 20 MG: 20 CAPSULE, DELAYED RELEASE ORAL at 08:37

## 2018-12-18 RX ADMIN — GABAPENTIN 600 MG: 600 TABLET, FILM COATED ORAL at 08:38

## 2018-12-18 RX ADMIN — POTASSIUM CHLORIDE, DEXTROSE MONOHYDRATE AND SODIUM CHLORIDE: 150; 5; 450 INJECTION, SOLUTION INTRAVENOUS at 04:19

## 2018-12-18 RX ADMIN — SERTRALINE HYDROCHLORIDE 150 MG: 100 TABLET ORAL at 08:35

## 2018-12-18 RX ADMIN — FUROSEMIDE 10 MG: 10 INJECTION, SOLUTION INTRAMUSCULAR; INTRAVENOUS at 16:02

## 2018-12-18 RX ADMIN — LIDOCAINE HYDROCHLORIDE 10 ML: 20 JELLY TOPICAL at 18:28

## 2018-12-18 RX ADMIN — METOPROLOL TARTRATE 25 MG: 25 TABLET ORAL at 08:38

## 2018-12-18 RX ADMIN — CEFAZOLIN SODIUM 1 G: 1 INJECTION, POWDER, FOR SOLUTION INTRAMUSCULAR; INTRAVENOUS at 04:25

## 2018-12-18 RX ADMIN — SODIUM CHLORIDE, POTASSIUM CHLORIDE, SODIUM LACTATE AND CALCIUM CHLORIDE 500 ML: 600; 310; 30; 20 INJECTION, SOLUTION INTRAVENOUS at 05:33

## 2018-12-18 RX ADMIN — ACETAMINOPHEN 975 MG: 325 TABLET, FILM COATED ORAL at 05:33

## 2018-12-18 ASSESSMENT — ACTIVITIES OF DAILY LIVING (ADL)
ADLS_ACUITY_SCORE: 11
ADLS_ACUITY_SCORE: 13

## 2018-12-18 NOTE — ANESTHESIA POSTPROCEDURE EVALUATION
Patient: Henna Li    Procedure(s):  ROBOTIC ASSISTED XI VENTRAL RECTOPEXY  DAVINCI XI SACROCOLPOPEXY, CYSTOSCOPY, HYDRODISTENTION  DAVINCI XI BILATERAL SALPINGO-OOPHORECTOMY    Diagnosis:PELVIC ORGAN PROLAPSE  Diagnosis Additional Information: No value filed.    Anesthesia Type:  General, ETT    Note:  Anesthesia Post Evaluation    Patient location during evaluation: PACU  Patient participation: Able to fully participate in evaluation  Level of consciousness: sleepy but conscious and responsive to verbal stimuli  Pain management: adequate  Airway patency: patent  Cardiovascular status: acceptable and hemodynamically stable  Respiratory status: acceptable and unassisted  Hydration status: acceptable  PONV: none     Anesthetic complications: None          Last vitals:  Vitals:    12/17/18 1710 12/17/18 1715 12/17/18 1742   BP: 116/64 116/64 124/56   Pulse:   59   Resp: 20 20 11   Temp:   36.8  C (98.3  F)   SpO2: 95% 95% 98%         Electronically Signed By: Omari Grey MD  December 17, 2018  6:22 PM

## 2018-12-18 NOTE — PROGRESS NOTES
Doing well. Explained the hydrodistension and the negative coude testing to her. There is no sling. Her pains sound normal and they will subside with time. Discontinue monk and anticipate discharge later today.

## 2018-12-18 NOTE — PLAN OF CARE
Pt A&O x4. Diminished LS. Hypo BS, -BM, denies passing gas. Lap sites x5 GIBRAN with liquid bandages. Held Toradol due to not voiding. Mabry removed @0840 and waiting for pt to void, will continue to monitor. A1. Discontinued IV fluids. Full liquid diet

## 2018-12-18 NOTE — PLAN OF CARE
A&x4. VSS on RA. Refused capno overnight. Lap sites GIBRAN liquid bandage. No spotting found on pad. Pain controlled with IV dilaudid 0.4mg. Tolerating sips of clears. Dangled and stood at bedside. Held scheduled Toradol, urine output in monk was 100cc in 6 hours, 500ml bolus of LR given. Output improved. Monk kept in place awaiting md. IVF running. Slept between cares.

## 2018-12-18 NOTE — PLAN OF CARE
[facility] VSS. A/O x 4. Lung sounds diminished throughout. BS hypoactive, no flatus. Mabry in place for anesthesia, adequate output. Capno, 2 L of O2. Pt denies pain, scheduled Toradol given at end of shift. Incision port sites x 5, liquid bandage, CDI.

## 2018-12-18 NOTE — PROVIDER NOTIFICATION
Dr. Minor paged    Pt has not voided since monk removal at 0840. PT input of 1110 and UO of zero. Bladder scanned 89 of urine. Awaiting for return of call.    Dr. Minor paged again about same concern. Awaiting for return of call.    MD called back. New order received for Lasix. Call back if no void and after bladder scan. On coming shift updated.

## 2018-12-18 NOTE — PROVIDER NOTIFICATION
Colorectal PA paged    Pts K+ 3.3 and Mg 1.6. Questioning replacement protocol. Waiting return back.       Addendum    Spoke with ROYAL Cueva, ordered to stop IV fluids running, no order replacement protocols ordered at this time. Will continue to monitor.

## 2018-12-18 NOTE — PROGRESS NOTES
Colorectal Surgery Progress Note  POD#1      Subjective:  Pain well controlled. Had some abdominal cramping that is improved this AM. Tolerated clears without nausea. Mabry in place with clear urine. No flatus/BM.     Vitals:  Vitals:    12/17/18 1812 12/18/18 0216 12/18/18 0440 12/18/18 0541   BP: 126/72 117/51  113/51   Pulse:  79     Resp: 12 12 14    Temp: 97.5  F (36.4  C) 98.2  F (36.8  C)     TempSrc: Oral Oral     SpO2: 92% 97%  97%   Weight:       Height:         I/O:  I/O last 3 completed shifts:  In: 2682 [P.O.:10; I.V.:2672]  Out: 585 [Urine:575; Blood:10]    Physical Exam:  Gen: AAOx3, NAD  Pulm: Non-labored breathing  Abd: Soft, non-distended, appropriately tender, no guarding   Incision C/D/I with dermabond  Ext:  Warm and well-perfused    BMP  Recent Labs   Lab 12/17/18  1121   *   POTASSIUM 3.4   CHLORIDE 98   CO2 23   BUN 20   CR 0.72   GLC 82     CBC  Recent Labs   Lab 12/17/18  1121   WBC 4.9   HGB 14.3   HCT 41.0            ASSESSMENT: This is a 68 year old female POD #1 s/p robotic ventral rectopexy with sacrocolpopexy, cystoscopy and BSO.     Clear liquids this AM. Advance to [employer] for lunch.  Mabry to be removed this AM.  Continue multimodal pain control.  Continue home medications.  Possible discharge home this evening if progresses well.    Plan to be discussed with Dr. Martin.       Stefani Kim MD   [facility]  Pager: [phone]  12/18/2018 at 7:17 AM

## 2018-12-19 LAB — COPATH REPORT: NORMAL

## 2018-12-19 NOTE — PROGRESS NOTES
COLON & RECTAL SURGERY  PROGRESS NOTE    December 18, 2018  Post-op Day # 1    SUBJECTIVE:  Unable to void since catheter removal early this morning.  No n/v.  No flatus    OBJECTIVE:  Temp:  [97.5  F (36.4  C)-98.2  F (36.8  C)] 98.2  F (36.8  C)  Pulse:  [58-79] 72  Heart Rate:  [61-69] 69  Resp:  [12-18] 18  BP: (100-126)/(48-72) 124/55  SpO2:  [92 %-97 %] 96 %    Intake/Output Summary (Last 24 hours) at 12/18/2018 1803  Last data filed at 12/18/2018 1400  Gross per 24 hour   Intake 2699 ml   Output 675 ml   Net 2024 ml       GENERAL:  Awake, alert, no acute distress,   ABDOMEN:  Soft, appropriately tender, non-distended,   INCISION:  C/d/i,     ASSESSMENT/PLAN: POD#1  - full liquid diet with toast and crackers.  Remain on this diet until passing consistent flatus/stool at home.  - replace catheter to leg bag for discharge.  Catheter can be removed in clinic with Dr. Minor later this week.  - tylenol and ibuprofen at home for pain control.  Avoid narcotics as able to avoid constipation  - drink plenty of fluids  - hold home metoprolol and hydrochlorothiazide until able to see PCP later this week  - surgical findings discussed with patient and .  - stable for discharge to home    Kristina Martin MD  [facility]  Pager:  [phone]  Phone: [phone]  Fax: [phone]

## 2018-12-19 NOTE — DISCHARGE SUMMARY
[facility] Discharge Summary      Henna Li MRN# [id number]   Age: 68 year old YOB: 1950     Date of Admission:  12/17/2018  Date of Discharge::  12/18/2018  7:20 PM  Admitting Physician:  Kristina Martin MD  Discharge Physician:  Kristina Martin MD     PCP:  Heidi Ferrari    Disposition: Patient discharged from [facility] to home in stable condition.        Primary Diagnosis:   Pelvic organ prolapse, urinary retention            Discharge Medications:   Discharge Medication List as of 12/18/2018  6:55 PM      START taking these medications    Details   HYDROcodone-acetaminophen (NORCO) 5-325 MG tablet Take 1 tablet by mouth every 6 hours as needed for severe pain, Disp-10 tablet, R-0, Local Print         CONTINUE these medications which have NOT CHANGED    Details   albuterol (PROAIR HFA/PROVENTIL HFA/VENTOLIN HFA) 108 (90 Base) MCG/ACT inhaler Inhale 2 puffs into the lungs daily as needed for shortness of breath / dyspnea or wheezing, Historical      Ascorbic Acid (VITAMIN C ADULT GUMMIES) 125 MG CHEW Take 1-2 chew tab by mouth daily, Historical      ASPIRIN PO Take 81 mg by mouth daily, Historical      Cholecalciferol (VITAMIN D PO) Take 2 tablets by mouth daily, Historical      conjugated estrogens (PREMARIN) 0.625 MG/GM vaginal cream Place vaginally once a week On Saturday., Historical      fluticasone (FLONASE) 50 MCG/ACT nasal spray Spray 2 sprays into both nostrils daily as needed , Historical      GABAPENTIN PO Take 600 mg by mouth 2 times daily In the morning and at bedtime., Historical      HYDROCHLOROTHIAZIDE PO Take 12.5 mg by mouth daily, Historical      IBUPROFEN PO Take 600 mg by mouth 3 times daily as needed for moderate pain , Historical      Multiple Vitamins-Minerals (MULTIVITAMIN ADULT PO) Take 1 tablet by mouth daily, Historical      Naphazoline-Pheniramine (NAPHCON-A OP) Place 2 drops into both eyes daily, Historical      Omeprazole  (PRILOSEC PO) Take 20 mg by mouth daily , Historical      Pentosan Polysulfate Sodium (ELMIRON PO) Take 100 mg by mouth 2 times daily In the morning and at bedtime., Historical      Sertraline HCl (ZOLOFT PO) Take 150 mg by mouth daily (Takes 1.5 x 100mg tablet = 150mg dose), Historical         STOP taking these medications       METOPROLOL SUCCINATE PO Comments:   Reason for Stopping:                      Follow Up, Special Instructions:   Discharge diet: Full liquid   Discharge activity: No lifting, driving, or strenuous exercise for 8 week(s)   Discharge follow-up: Follow up with Dr. Martin in 6 weeks   Wound care: Keep wound clean and dry              Procedures:   Procedure(s): Robotic ventral rectopexy and robotic sacrocolpopexy, bilateral salpingectomy and oophorectomy and cystoscopy               Consultations:   none          Brief Hospital Summary:   Patient is a 68 year old woman.  she underwent the above procedure on 12/17/18 by Dr. Martin and Dr. Minor.   There were no immediate complications during this procedure.    Please refer to the full operative summary for details.  The patient's hospital course was unremarkable.  She wasn't able to void after the monk was removed so the monk was replaced. She tolerated a diet and her pain was controlled. she recovered as anticipated and experienced no post-operative complications.         Attestation:  I have reviewed today's vital signs, notes, medications, labs and imaging.    Bonnie Mejia PA-C  [facility]          ADDENDUM:  Length of stay: 1 days  Indicate Y or N for the following:  UTI  No  C diff  No  PNA  No  SSI No  DVT No  PE  No  CVA No  MI No  Enterocutaneous fistula  No  Peripheral nerve injury  No  Abscess (not adjacent to anastomosis)  No  Leak No    Treated with:   Antibiotics N/A   Drain  N/A   Reoperation  N/A  Death within 30 days No  Reintubation  No  Reoperation  No    Time spent:  less then 30 minutes spent in  counseling and coordination of care for discharge.

## 2018-12-23 NOTE — OP NOTE
PREOPERATIVE DIAGNOSIS: Pelvic organ prolapse: rectocele, rectal intussusception, cystocele, symptoms of obstructive defecation  POSTOPERATIVE DIAGNOSIS: Pelvic organ prolapse: rectocele, rectal intussusception, cystocele     OPERATION PERFORMED:   1. Robotic ventral rectopexy with exclusion of the small bowel from the pelvis using mesh (Dr. Martin)  2. Rectocele repair (Dr. Martin)  3. Robotic sacrocolpopexy (Dr. Minor)  4. Robotic bilateral salpingo-oophrectomy (Dr. Minor)  5. Cystoscopy (Dr. Minor)     SURGEON: Kristina Martin MD   ASSISTANT: ESTEBAN King    COSURGEON: Maurice Minor MD (and ESTEBAN Damico as assist)     ANESTHESIA: General.      INDICATION: Henna Li is a 68 year old female who has worsening symptoms of irregular bowel habits, difficult evacuation of stool and pelvic pressure. Defecography confirmed the presence of internal intussusception and rectocele with associated cystocele. She has combined anterior, middle and posterior pelvic compartment prolapse. The risks and benefits of proceeding with a robotic ventral rectopexy and sacrocolpopexy have been discussed with Henna Li and she would like to proceed.      OPERATIVE FINDINGS:  The uterus was surgically absent. The bilateral fallopian tubes and ovaries appeared grossly normal and were removed by Dr. Minor.The patient has a very deep pouch of Robert. A ventral rectopexy and rectocele repair was performed using permanent, light weight prolene, mesh. The Y-shaped mesh was sutured to the levator muscles and anterior rectum, then Dr. Minor secured the mesh to the vaginal apex and sacral promontory. The mesh was completely reperitonealized. Cystoscopy was performed by Dr. Minor and was normal.     PROCEDURE IN DETAIL: After informed consent was obtained, the patient was brought to the operating room and placed in the supine position on the operating room table. Sequential compression devices were placed on  bilateral lower extremities prior to induction, and general anesthesia was induced without difficulty. She was placed in a well-padded dorsal lithotomy position and IV antibiotics were administered. A Pigazzi pink pad was used on the operating room table to prevent her from sliding off the table in steep Trendelenburg position. Her abdomen was sterilely prepped and draped in standard fashion.  Dr. Minor then placed a monk catheter without difficulty. The Ector vaginal retractor was placed into the vagina and upward traction was placed on the vagina.   A 8 mm vertical incision was made in the umbilicus with a #11 blade after instillation of 0.5% Marcaine in the skin and subcutaneous tissue. The abdominal wall was elevated and the Veress needle inserted.  A saline drop test confirmed appropriate position of the needle in the abdomen and the abdomen was insufflated with CO2 gas to a pressure of 15 mmHg. A 8 mm bladeless robotic trocar was inserted. A 8 mm robotic scope was inserted into the abdomen, and the abdomen was explored. The operative findings are noted above. Five additional bladeless trocars were inserted into the abdominal cavity in the following locations after instillation of 0.5% Marcaine in the abdominal wall.   1. An 8 mm robotic port placed in the mid clavicular line on the right side of the abdomen at least 8 cm lateral to the camera port.   2. An 8 mm robotic port was placed in the mid clavicular line on the left side of the abdomen at least 8 cm lateral to the camera port.   3. An 8 mm robotic port was placed 4 cm above the left superior iliac crest and 8 cm lateral from the other robotic port on the left side of the abdomen.   4. A 8 mm assistant port was placed on the right side of the abdomen 5 cm lateral and just inferior to the right sided robotic port.   The patient was placed in steep Trendelenburg position. The [employer] XI robot was docked to the 4 robotic ports. Rebecca Bartlett remained  as the beside assist while I worked at the robot console. The three robotic instruments were inserted into the abdominal cavity under direct visualization. The robotic 30 degree laparoscope was used. The sigmoid colon and small bowel was reflected completely out of the pelvis in order to better view the pelvic floor. The patient has a very deep pouch of Robert, and a lot of laxity in pelvic floor tissues.  Dr. Minor performed the bilateral salpingo-oophorectomy. Once this was completed, Dr. Minor began dissection for a sacrocolpopexy.   The peritoneum distal to where the inferior mesenteric artery was identified was opened using cautery with robotic scissors in robot arm #1. Eventually, the sacral promontory and the anterior longitudinal ligament was clearly visible and suitable for the mesh to eventually be secured in this location. A tunnel was created under the peritoneum from the promontory to the right side of the uterosacro ligament.  The lateral ligaments remained intact. Dr. Minor raised flaps of peritoneum anteriorly and inferiorly over the vaginal apex.     I performed the deep pelvic dissection within the rectovaginal septum, working independently at the robot console. I continued to raise the flap inferiorly along the posterior wall of the vagina into the rectovaginal septum. Prior to opening the peritoneal reflection of the rectovaginal septum, the vagina was retracted towards the bladder using a vaginal retracting device.There was a large amount of redundancy and laxity in the tissue in this plane. Dissection proceeded within the rectovaginal septum for approximately 6-8 cm, down to the level of the levator muscles. The levator muscles were exposed on both sides of the rectum.  The dissection was carried down to the pelvic floor to ensure adequate repair of rectal intussusception and rectocele.      The light weight, permanent mesh placed through the 8 mm right upper quadrant port and laid flat  within the pelvis, with the distal portion extending over the anterior portion of the rectum at the level of the levator muscles.   In preparation for sewing the mesh into place, a needle  was placed in robotic arm #2 and robotic arm #1. Rebecca Bartlett, bedside assist, was able to pass 2-0 Ethibond sutures through the 12 mm assistant port, and 2-0 Ethibond suture was then used to secure the mesh to the levators on both sides of the rectum anteriorly. Nine additional 2-0 Vicryl sutures were then used to secure the mesh to the anterior portion of the rectum, progressing proximally on the rectum. These sutures were placed in seromuscular layers of the rectum.  The mesh was secured to the anterior rectal wall at the most distal extent to ensure repair of rectocele.  The mesh was not on tension across the distal rectum. This completed the ventral rectopexy and rectocele repair portion of the mesh placement.    Dr. Minor then performed a sacrocolpopexy by securing the y-portion of the mesh to the vaginal apex. There was appropriate laxity between the sutures on the anterior rectal wall and the the sutures to the posterior vagina, without undo tension. Two 2-0 Gortex sutures were used to secure the mesh at the pre-cleared location on the sacral promontory. These stitches were secured to the anterior longitudinal ligament. The mesh sat comfortably within the pelvis. The mesh was completely reperitonealized by closing the peritoneum using 2-0 Vicryl suture at the promontory and 2-0 v-loc suture over the vaginal apex.  Dr. Minor and I rescrubbed into the procedure to remove the fallopian tubes and ovaries from the abdominal cavity.  All robotic instruments were removed from the abdomen and the robot was undocked.  The fascia of the assist port was closed with the Nash-Grace device and 0-vicryl suture. The skin of the port sites was closed with 4-0 Monocryl in interrupted fashion and dermabond. Dr. Minor  performed cystoscopy which was normal.      The patient tolerated this procedure well, without complications. Estimated blood loss was 10 mL. I was present and scrubbed for my portion of this operation. The patient was returned to the supine position, extubated in the operating room, and brought to the recovery room in stable condition.     BARTOLO PABON MD

## 2019-09-03 ENCOUNTER — HOSPITAL ENCOUNTER (OUTPATIENT)
Facility: CLINIC | Age: 69
Discharge: HOME | End: 2019-09-03
Attending: COLON & RECTAL SURGERY | Admitting: COLON & RECTAL SURGERY
Payer: COMMERCIAL

## 2019-09-03 VITALS
SYSTOLIC BLOOD PRESSURE: 132 MMHG | BODY MASS INDEX: 22.96 KG/M2 | OXYGEN SATURATION: 93 % | HEIGHT: 69 IN | WEIGHT: 155 LBS | RESPIRATION RATE: 11 BRPM | DIASTOLIC BLOOD PRESSURE: 82 MMHG | HEART RATE: 54 BPM

## 2019-09-03 LAB — COLONOSCOPY: NORMAL

## 2019-09-03 PROCEDURE — 45378 DIAGNOSTIC COLONOSCOPY: CPT | Performed by: COLON & RECTAL SURGERY

## 2019-09-03 PROCEDURE — G0121 COLON CA SCRN NOT HI RSK IND: HCPCS | Performed by: COLON & RECTAL SURGERY

## 2019-09-03 PROCEDURE — G0500 MOD SEDAT ENDO SERVICE >5YRS: HCPCS | Performed by: COLON & RECTAL SURGERY

## 2019-09-03 PROCEDURE — 25000128 H RX IP 250 OP 636: Performed by: COLON & RECTAL SURGERY

## 2019-09-03 RX ORDER — FENTANYL CITRATE 50 UG/ML
INJECTION, SOLUTION INTRAMUSCULAR; INTRAVENOUS PRN
Status: DISCONTINUED | OUTPATIENT
Start: 2019-09-03 | End: 2019-09-03 | Stop reason: HOSPADM

## 2019-09-03 RX ORDER — ONDANSETRON 2 MG/ML
4 INJECTION INTRAMUSCULAR; INTRAVENOUS EVERY 6 HOURS PRN
Status: DISCONTINUED | OUTPATIENT
Start: 2019-09-03 | End: 2019-09-03 | Stop reason: HOSPADM

## 2019-09-03 RX ORDER — NALOXONE HYDROCHLORIDE 0.4 MG/ML
.1-.4 INJECTION, SOLUTION INTRAMUSCULAR; INTRAVENOUS; SUBCUTANEOUS
Status: DISCONTINUED | OUTPATIENT
Start: 2019-09-03 | End: 2019-09-03 | Stop reason: HOSPADM

## 2019-09-03 RX ORDER — LIDOCAINE 40 MG/G
CREAM TOPICAL
Status: DISCONTINUED | OUTPATIENT
Start: 2019-09-03 | End: 2019-09-03 | Stop reason: HOSPADM

## 2019-09-03 RX ORDER — ONDANSETRON 2 MG/ML
4 INJECTION INTRAMUSCULAR; INTRAVENOUS
Status: DISCONTINUED | OUTPATIENT
Start: 2019-09-03 | End: 2019-09-03 | Stop reason: HOSPADM

## 2019-09-03 RX ORDER — FLUMAZENIL 0.1 MG/ML
0.2 INJECTION, SOLUTION INTRAVENOUS
Status: DISCONTINUED | OUTPATIENT
Start: 2019-09-03 | End: 2019-09-03 | Stop reason: HOSPADM

## 2019-09-03 RX ORDER — ONDANSETRON 4 MG/1
4 TABLET, ORALLY DISINTEGRATING ORAL EVERY 6 HOURS PRN
Status: DISCONTINUED | OUTPATIENT
Start: 2019-09-03 | End: 2019-09-03 | Stop reason: HOSPADM

## 2019-09-03 RX ORDER — PROCHLORPERAZINE MALEATE 5 MG
5 TABLET ORAL EVERY 6 HOURS PRN
Status: DISCONTINUED | OUTPATIENT
Start: 2019-09-03 | End: 2019-09-03 | Stop reason: HOSPADM

## 2019-09-03 ASSESSMENT — MIFFLIN-ST. JEOR: SCORE: 1302.46

## 2019-09-03 NOTE — H&P
Pre-Endoscopy History and Physical   Henna Li MRN# [id number]   YOB: 1950 Age: 68 year old   Date of Procedure: 9/3/2019   Primary care provider: Heidi Ferrari   Type of Endoscopy: colonoscopy   Reason for Procedure: screening   Type of Anesthesia Anticipated: Moderate Sedation   HPI:   Henna is a 68 year old female for screening colonoscopy.  She does not remember the last colonoscopy that she had, but reports it was a long time ago.  She denies BRBPR, changes in bowel habits, nausea/vomiting or unintentional weight loss.  She reports abdominal pain secondary to her longstanding IBS.  She denies a FH of CRC.    Allergies   Allergen Reactions     Azithromycin      gastrointestinal     Sulfa Drugs GI Disturbance     Syncope       Tetracyclines GI Disturbance      Prior to Admission Medications   Prescriptions Last Dose Informant Patient Reported? Taking?   Ascorbic Acid (VITAMIN C ADULT GUMMIES) 125 MG CHEW  Self Yes No   Sig: Take 1-2 chew tab by mouth daily   Cholecalciferol (VITAMIN D PO)  Self Yes No   Sig: Take 2 tablets by mouth daily   GABAPENTIN PO  Self Yes No   Sig: Take 600 mg by mouth 2 times daily In the morning and at bedtime.   HYDROCHLOROTHIAZIDE PO  Self Yes No   Sig: Take 12.5 mg by mouth daily   IBUPROFEN PO  Self Yes No   Sig: Take 600 mg by mouth 3 times daily as needed for moderate pain    Multiple Vitamins-Minerals (MULTIVITAMIN ADULT PO)  Self Yes No   Sig: Take 1 tablet by mouth daily   Naphazoline-Pheniramine (NAPHCON-A OP)  Self Yes No   Sig: Place 2 drops into both eyes daily   Omeprazole (PRILOSEC PO)  Self Yes No   Sig: Take 20 mg by mouth daily    Pentosan Polysulfate Sodium (ELMIRON PO)  Self Yes No   Sig: Take 100 mg by mouth 2 times daily In the morning and at bedtime.   Sertraline HCl (ZOLOFT PO)  Self Yes No   Sig: Take 150 mg by mouth daily (Takes 1.5 x 100mg tablet = 150mg dose)   albuterol (PROAIR HFA/PROVENTIL HFA/VENTOLIN HFA) 108 (90 Base) MCG/ACT  inhaler  Self Yes No   Sig: Inhale 2 puffs into the lungs daily as needed for shortness of breath / dyspnea or wheezing   conjugated estrogens (PREMARIN) 0.625 MG/GM vaginal cream  Self Yes No   Sig: Place vaginally once a week On Saturday.      Facility-Administered Medications: None      Patient Active Problem List   Diagnosis     Enterocele      Past Medical History:   Diagnosis Date     Allergic rhinitis      Arthritis      Depression      Dyspareunia, female      Hypertension      Internal intussusception of rectum (H)      Interstitial cystitis      Irritable bowel syndrome with diarrhea      Neuropathy of lower extremity, unspecified laterality     bilat.     Osteoarthritis      Osteopenia      Prolapse of vaginal wall      Rectocele      Spinal stenosis      Takotsubo cardiomyopathy 2013    resolved      Past Surgical History:   Procedure Laterality Date     BREAST SURGERY      augmentation     COSMETIC SURGERY  2017    facelift, blepharoplasty     DAVINCI RECTOPEXY N/A 12/17/2018    Procedure: ROBOTIC ASSISTED XI VENTRAL RECTOPEXY;  Surgeon: Kristina Martin MD;  Location:  OR     DAVINCI SACROCOLPOPEXY, MIDURETHRAL SLING, CYSTOSCOPY N/A 12/17/2018    Procedure: DAVINCI XI SACROCOLPOPEXY, CYSTOSCOPY, HYDRODISTENTION;  Surgeon: Maurice Minor MD;  Location:  OR     DAVINCI SALPINGO-OOPHORECTOMY INCLUDING BILATERAL Bilateral 12/17/2018    Procedure: DAVINCI XI BILATERAL SALPINGO-OOPHORECTOMY;  Surgeon: Maurice Minor MD;  Location:  OR     ENT SURGERY      tonsilectomy     GYN SURGERY      perineoplasty, mesh placement for vaginal prolapse     GYN SURGERY  2010    hysterectomy     ORTHOPEDIC SURGERY      neuroma,hammertoes      Social History     Tobacco Use     Smoking status: Former Smoker     Last attempt to quit: 2016     Years since quitting: 3.6     Smokeless tobacco: Never Used   Substance Use Topics     Alcohol use: Yes     Comment: occcas 1-2/ week      No family history on file.  "  PHYSICAL EXAM:   /89   Resp 16   Ht 1.753 m (5' 9\")   Wt 70.3 kg (155 lb)   SpO2 96%   BMI 22.89 kg/m   Estimated body mass index is 22.89 kg/m  as calculated from the following:    Height as of this encounter: 1.753 m (5' 9\").    Weight as of this encounter: 70.3 kg (155 lb).   RESP: lungs clear to auscultation - no rales, rhonchi or wheezes   CV: regular rates and rhythm   ASA Class 2 - Mild systemic disease    Assessment: 67 y/o woman for screening colonoscopy    Plan: Colonoscopy with moderate sedation.  Risks of the procedure were discussed including, but not limited to, bleeding, perforation and missed lesions.  Patient understands and is willing to proceed.    Dany Caro MD ....................  9/3/2019   10:18 AM  Colon and Rectal Surgery Staff  [phone]    "

## 2019-10-17 ENCOUNTER — THERAPY VISIT (OUTPATIENT)
Dept: PHYSICAL THERAPY | Facility: CLINIC | Age: 69
End: 2019-10-17
Payer: COMMERCIAL

## 2019-10-17 DIAGNOSIS — K59.00 CONSTIPATION: ICD-10-CM

## 2019-10-17 DIAGNOSIS — M99.05 PELVIC SOMATIC DYSFUNCTION: ICD-10-CM

## 2019-10-17 PROCEDURE — 97112 NEUROMUSCULAR REEDUCATION: CPT | Mod: GP | Performed by: PHYSICAL THERAPIST

## 2019-10-17 PROCEDURE — 97530 THERAPEUTIC ACTIVITIES: CPT | Mod: GP | Performed by: PHYSICAL THERAPIST

## 2019-10-17 PROCEDURE — 97161 PT EVAL LOW COMPLEX 20 MIN: CPT | Mod: GP | Performed by: PHYSICAL THERAPIST

## 2019-10-17 PROCEDURE — 97140 MANUAL THERAPY 1/> REGIONS: CPT | Mod: GP | Performed by: PHYSICAL THERAPIST

## 2019-10-17 NOTE — LETTER
[facility]IC [facility]  [address]  [facility] [address]  [phone]    2019    Re: Henna Li   :   1950  MRN:  [id number]   REFERRING PHYSICIAN:   Stefani Kim    [facility]    Date of Initial Evaluation:  10-17-19  Visits:  Rxs Used: 1  Reason for Referral:     Pelvic somatic dysfunction  Constipation    EVALUATION SUMMARY    [facility]  Subjective:  HPI  SUBJECTIVE:  Patient reports onset of symptoms of constipation and diarrhea.  This has been ongoing for 10 years.  .Symptoms include IBS symptoms that are cyclical.  She will constipated for a couple of days, then have a lot of gas, then have diarrhea and nausea.  This cycle will repeat over a week. She reports that she will have about 1-2 BM per week.  There are a lot of times where she will feel an urge and then go to sit down and will unsuccesful with attempt and nothing will come out.  She will have pain, bloating and gassy feelings in abdomen.  Pt goal is to help relieve any IBS symptoms.  Has tried miralax but makes her even more gassy at times.  Since onset symptoms have been getting better, worse or staying the same? Same.  Pt had robotic surgery in Dec 2017 for rectal prolapse.     Urination:  Do you leak on the way to the bathroom or with a strong urge to void? No    Do you leak with cough,sneeze, jumping, running?No   Any other activities that cause leaking? none  Do you have triggers that make you feel you can't wait to go to the bathroom? Yes   what are they:  Leaving home.  Type of pad and number used per day? none  When you leak what is the amount? Small when it happens    How long can you delay the need to urinate? A few hours.   How many times do you get up to urinate at night? 1  Can you stop the flow of urine when on the toilet? Yes    Re: Henna iL   :    1950    Is the volume of urine passed usually: small. (8sec rule=  250ml with average bladder storing  400-600ml)    Do you strain to pass urine? No  Do you have a slow or hesitant urinary stream? Yes  Do you have difficulty initiating the urine stream? No    How many bladder infections have you had in last 12 months?NA    Fluid intake(one glass is 8oz or one cup) 4glasses/day, 2-3 caffinated glasses/day  < 1 alcohol glasses/day.    Bowel habits:  Frequency of bowel movements?1 - 2 times a/week, varies  Consistancy of stool? soft, soft formed, Eugene Stool Scale reports type 5,6,7  Do you ignore the urge to defecate? Yes  Do you strain to pass stool? Yes    Pelvic Pain:  When do you have pelvic pain? Urinating after showers  Is initial penetration during intercourse painful? Yes  Is deeper penetration painful? Yes  Do you use lubricant? yes What kind? Varies  Given birth? Yes Any complications?no, # of vaginal delieveries?3,   Are you sexually active?Yes  Have you ever been worried for your physical safety? No  Any abdominal or pelvic surgeries? Yes, rectal prolapse, hysterectomy  Are you having any regular exercise?no  Have you practiced the PF(kegel) exercises for 4 or more weeks?no  Marinoff Scale:Level 1  (Level 3: Abstinence from intercourse because of severe pain. Level 2: Painful intercourse which limites frequency of activity. Level 1: Painful intercourse not severe enough to prevent activity.)                               Objective:  System    Pelvic Dysfunction Evaluation:    Flexibility:    Tightness present at:Adductors; Iliopsoas; Piriformis and Gluteals  Abdominal Wall:  Abdominal wall pelvic: hypomobile lower abdominal fascial mobility.  Pelvic Clock Exam:  Pelvic clock exam: rectal tenderness to palaption noted at EAS, coccygeus and puborectalis.  Levator ANI:  ++  External Assessment:    Skin Condition:  Normal  Scars:  Well healed  Bearing Down/Coughing:  Normal  Re: Henna Li   :    1950    Tissue Symmetry:  Normal  Introitus:  Normal  Muscle Contraction/Perineal Mobility:  Slight lift, no urogential triangle descent and substitution  Internal Assessment:  Internal assessment pelvic: EAS 1/5 strength,  when asked to bulge/strain, did feel descent of perineum.  however, pt does hold her breath with pushing and sucks in her abdomen.  Contraction/Grade:  Fllicker muscles stretched (1)  Accessory Muscle use-Gluteals:  Yes    SEMG Biofeedback:    Equipment:  [id number]  Suraface electrode placement--Perianal:  Yes  Baseline EMG PM:  1.9 uV supine, sitting 1.6 uV .  with strain, did note increased PFM activity and paradoxic coordination   General   ROS    Assessment/Plan:    Patient is a 69 year old female with pelvic complaints.    Patient has the following significant findings with corresponding treatment plan.                Diagnosis 1:  Constipation/pelvic floor dysfunction  Pain -  manual therapy, self management, education and home program  Decreased ROM/flexibility - manual therapy, therapeutic exercise, therapeutic activity and home program  Decreased strength - therapeutic exercise, therapeutic activities and home program  Impaired muscle performance - biofeedback, neuro re-education and home program  Decreased function - therapeutic activities and home program  Impaired posture - neuro re-education, therapeutic activities and home program    Therapy Evaluation Codes:   1) History comprised of:   Personal factors that impact the plan of care:      Time since onset of symptoms.    Comorbidity factors that impact the plan of care are:      IBS, interstitial cystitis.     Medications impacting care: None.  2) Examination of Body Systems comprised of:   Body structures and functions that impact the plan of care:      Pelvis.   Activity limitations that impact the plan of care are:      constipation, incomplete emptying.  3) Clinical presentation characteristics  are:   Stable/Uncomplicated.  4) Decision-Making    Low complexity using standardized patient assessment instrument and/or measureable assessment of functional outcome.  Cumulative Therapy Evaluation is: Low complexity.    Communication ability:  Patient appears to be able to clearly communicate and understand verbal and written communication and follow directions correctly.  Re: Henna Li   :   1950    Treatment Explanation - The following has been discussed with the patient:   RX ordered/plan of care  Anticipated outcomes  Possible risks and side effects  This patient would benefit from PT intervention to resume normal activities.   Rehab potential is good.  Frequency:  1 X week, once daily  Duration:  for 8 weeks  Discharge Plan:  Achieve all LTG.  Independent in home treatment program.  Reach maximal therapeutic benefit.    Thank you for your referral.    INQUIRIES  Therapist: Stef Boone DPT   [facility] [place] [facility]  [address] [place], [place] [id number]  [facility] [address]  Phone: [phone]  Fax: [phone]

## 2019-10-17 NOTE — PROGRESS NOTES
[facility] Initial Evaluation  Subjective:  HPI  SUBJECTIVE:  Patient reports onset of symptoms of constipation and diarrhea.  This has been ongoing for 10 years.  .Symptoms include IBS symptoms that are cyclical.  She will constipated for a couple of days, then have a lot of gas, then have diarrhea and nausea.  This cycle will repeat over a week. She reports that she will have about 1-2 BM per week.  There are a lot of times where she will feel an urge and then go to sit down and will unsuccesful with attempt and nothing will come out.  She will have pain, bloating and gassy feelings in abdomen.  Pt goal is to help relieve any IBS symptoms.  Has tried miralax but makes her even more gassy at times.  Since onset symptoms have been getting better, worse or staying the same? Same.  Pt had robotic surgery in Dec 2017 for rectal prolapse.     Urination:  Do you leak on the way to the bathroom or with a strong urge to void? No    Do you leak with cough,sneeze, jumping, running?No   Any other activities that cause leaking? none  Do you have triggers that make you feel you can't wait to go to the bathroom? Yes   what are they:  Leaving home.  Type of pad and number used per day? none  When you leak what is the amount? Small when it happens    How long can you delay the need to urinate? A few hours.   How many times do you get up to urinate at night? 1  Can you stop the flow of urine when on the toilet? Yes  Is the volume of urine passed usually: small. (8sec rule=  250ml with average bladder storing  400-600ml)    Do you strain to pass urine? No  Do you have a slow or hesitant urinary stream? Yes  Do you have difficulty initiating the urine stream? No    How many bladder infections have you had in last 12 months?NA    Fluid intake(one glass is 8oz or one cup) 4glasses/day, 2-3 caffinated glasses/day  < 1 alcohol glasses/day.    Bowel habits:  Frequency of bowel movements?1 - 2 times a/week,  varies  Consistancy of stool? soft, soft formed, Rowan Stool Scale reports type 5,6,7  Do you ignore the urge to defecate? Yes  Do you strain to pass stool? Yes    Pelvic Pain:  When do you have pelvic pain? Urinating after showers  Is initial penetration during intercourse painful? Yes  Is deeper penetration painful? Yes  Do you use lubricant? yes What kind? Varies      Given birth? Yes Any complications?no, # of vaginal delieveries?3,   Are you sexually active?Yes  Have you ever been worried for your physical safety? No  Any abdominal or pelvic surgeries? Yes, rectal prolapse, hysterectomy  Are you having any regular exercise?no  Have you practiced the PF(kegel) exercises for 4 or more weeks?no    Marinoff Scale:Level 1  (Level 3: Abstinence from intercourse because of severe pain. Level 2: Painful intercourse which limites frequency of activity. Level 1: Painful intercourse not severe enough to prevent activity.)                                    Objective:  System                                 Pelvic Dysfunction Evaluation:        Flexibility:    Tightness present at:Adductors; Iliopsoas; Piriformis and Gluteals    Abdominal Wall:  Abdominal wall pelvic: hypomobile lower abdominal fascial mobility.        Pelvic Clock Exam:  Pelvic clock exam: rectal tenderness to palaption noted at EAS, coccygeus and puborectalis.        Levator ANI:  ++        External Assessment:    Skin Condition:  Normal  Scars:  Well healed  Bearing Down/Coughing:  Normal  Tissue Symmetry:  Normal  Introitus:  Normal  Muscle Contraction/Perineal Mobility:  Slight lift, no urogential triangle descent and substitution  Internal Assessment:  Internal assessment pelvic: EAS 1/5 strength,  when asked to bulge/strain, did feel descent of perineum.  however, pt does hold her breath with pushing and sucks in her abdomen.    Contraction/Grade:  Fllicker muscles stretched (1)    Accessory Muscle use-Gluteals:  Yes      SEMG Biofeedback:     Equipment:  [id number]    Suraface electrode placement--Perianal:  Yes  Baseline EMG PM:  1.9 uV supine, sitting 1.6 uV .  with strain, did note increased PFM activity and paradoxic coordination                                  General     ROS    Assessment/Plan:    Patient is a 69 year old female with pelvic complaints.    Patient has the following significant findings with corresponding treatment plan.                Diagnosis 1:  Constipation/pelvic floor dysfunction  Pain -  manual therapy, self management, education and home program  Decreased ROM/flexibility - manual therapy, therapeutic exercise, therapeutic activity and home program  Decreased strength - therapeutic exercise, therapeutic activities and home program  Impaired muscle performance - biofeedback, neuro re-education and home program  Decreased function - therapeutic activities and home program  Impaired posture - neuro re-education, therapeutic activities and home program    Therapy Evaluation Codes:   1) History comprised of:   Personal factors that impact the plan of care:      Time since onset of symptoms.    Comorbidity factors that impact the plan of care are:      IBS, interstitial cystitis.     Medications impacting care: None.  2) Examination of Body Systems comprised of:   Body structures and functions that impact the plan of care:      Pelvis.   Activity limitations that impact the plan of care are:      constipation, incomplete emptying.  3) Clinical presentation characteristics are:   Stable/Uncomplicated.  4) Decision-Making    Low complexity using standardized patient assessment instrument and/or measureable assessment of functional outcome.  Cumulative Therapy Evaluation is: Low complexity.    Previous and current functional limitations:  (See Goal Flow Sheet for this information)    Short term and Long term goals: (See Goal Flow Sheet for this information)     Communication ability:  Patient appears to be able to clearly communicate  and understand verbal and written communication and follow directions correctly.  Treatment Explanation - The following has been discussed with the patient:   RX ordered/plan of care  Anticipated outcomes  Possible risks and side effects  This patient would benefit from PT intervention to resume normal activities.   Rehab potential is good.    Frequency:  1 X week, once daily  Duration:  for 8 weeks  Discharge Plan:  Achieve all LTG.  Independent in home treatment program.  Reach maximal therapeutic benefit.    Please refer to the daily flowsheet for treatment today, total treatment time and time spent performing 1:1 timed codes.

## 2019-10-31 ENCOUNTER — THERAPY VISIT (OUTPATIENT)
Dept: PHYSICAL THERAPY | Facility: CLINIC | Age: 69
End: 2019-10-31
Payer: COMMERCIAL

## 2019-10-31 DIAGNOSIS — M99.05 PELVIC SOMATIC DYSFUNCTION: ICD-10-CM

## 2019-10-31 DIAGNOSIS — K59.00 CONSTIPATION: ICD-10-CM

## 2019-10-31 PROCEDURE — 97140 MANUAL THERAPY 1/> REGIONS: CPT | Mod: GP | Performed by: PHYSICAL THERAPIST

## 2019-10-31 PROCEDURE — 97530 THERAPEUTIC ACTIVITIES: CPT | Mod: GP | Performed by: PHYSICAL THERAPIST

## 2019-11-05 ENCOUNTER — THERAPY VISIT (OUTPATIENT)
Dept: PHYSICAL THERAPY | Facility: CLINIC | Age: 69
End: 2019-11-05
Payer: COMMERCIAL

## 2019-11-05 DIAGNOSIS — K59.00 CONSTIPATION: ICD-10-CM

## 2019-11-05 DIAGNOSIS — M99.05 PELVIC SOMATIC DYSFUNCTION: ICD-10-CM

## 2019-11-05 PROCEDURE — 97140 MANUAL THERAPY 1/> REGIONS: CPT | Mod: GP | Performed by: PHYSICAL THERAPIST

## 2019-11-05 PROCEDURE — 97530 THERAPEUTIC ACTIVITIES: CPT | Mod: GP | Performed by: PHYSICAL THERAPIST

## 2019-11-12 ENCOUNTER — THERAPY VISIT (OUTPATIENT)
Dept: PHYSICAL THERAPY | Facility: CLINIC | Age: 69
End: 2019-11-12
Payer: COMMERCIAL

## 2019-11-12 DIAGNOSIS — K59.00 CONSTIPATION: ICD-10-CM

## 2019-11-12 DIAGNOSIS — M99.05 PELVIC SOMATIC DYSFUNCTION: ICD-10-CM

## 2019-11-12 PROCEDURE — 97140 MANUAL THERAPY 1/> REGIONS: CPT | Mod: GP | Performed by: PHYSICAL THERAPIST

## 2019-11-12 PROCEDURE — 97530 THERAPEUTIC ACTIVITIES: CPT | Mod: GP | Performed by: PHYSICAL THERAPIST

## 2020-01-21 PROBLEM — M99.05 PELVIC SOMATIC DYSFUNCTION: Status: RESOLVED | Noted: 2019-10-17 | Resolved: 2020-01-21

## 2020-01-21 PROBLEM — K59.00 CONSTIPATION: Status: RESOLVED | Noted: 2019-10-17 | Resolved: 2020-01-21

## 2020-01-21 NOTE — PROGRESS NOTES
Discharge Note    Progress reporting period is from last soap note on Nov 12, 2019.    Henna failed to follow up and current status is unknown.  Please see information below for last relevant information on current status.  Patient seen for 4 visits.    SUBJECTIVE  Subjective changes noted by patient:  pt has tried about 1 week of no gluten or dairy and limiting sugar.  Still feeling gassy.  She continues to note improved emptying when having a bowel movement. Also reports that stool can be more type 4.  She has not gone in the last 3-4 days but admits that she did suppress urge due to her schedule being a little different.  .  Current pain level is  .     Previous pain level was   .   Changes in function:  Yes (See Goal flowsheet attached for changes in current functional level)  Adverse reaction to treatment or activity: None    OBJECTIVE  Changes noted in objective findings: rectal exam does have less tightness noted.  EAS strength 1/5. improved push when asked to push/strain.       ASSESSMENT/PLAN  Diagnosis: constipation   Updated problem list and treatment plan:   Pain - HEP  Decreased ROM/flexibility - HEP  Decreased function - HEP  Decreased strength - HEP  STG/LTGs have been met or progress has been made towards goals:  Yes, please see goal flowsheet for most current information  Assessment of Progress: current status is unknown.    Last current status: Pt is progressing as expected   Self Management Plans:  HEP  I have re-evaluated this patient and find that the nature, scope, duration and intensity of the therapy is appropriate for the medical condition of the patient.  Henna continues to require the following intervention to meet STG and LTG's:  HEP.    Recommendations:  Discharge with current home program.  Patient to follow up with MD as needed.    Please refer to the daily flowsheet for treatment today, total treatment time and time spent performing 1:1 timed codes.

## (undated) DEVICE — TUBING IRRIG CYSTO/BLADDER SET 81" LF 2C4040

## (undated) DEVICE — TUBING CONMED AIRSEAL SMOKE EVAC INSUFFLATION ASM-EVAC

## (undated) DEVICE — SOL WATER IRRIG 1000ML BOTTLE 2F7114

## (undated) DEVICE — GLOVE PROTEXIS W/NEU-THERA 8.0  2D73TE80

## (undated) DEVICE — ESU GROUND PAD UNIVERSAL W/O CORD

## (undated) DEVICE — SUCTION IRR STRYKERFLOW II W/TIP 250-070-520

## (undated) DEVICE — DAVINCI XI OBTURATOR BLADELESS 8MM 470359

## (undated) DEVICE — DAVINCI HOT SHEARS TIP COVER  400180

## (undated) DEVICE — SU WND CLOSURE VLOC 180 ABS 3-0 6" V-20 VLOCL0604

## (undated) DEVICE — UTERINE MANIPULATOR RUMI TIP SACROCOLPOPEXY LG SACRO-1

## (undated) DEVICE — LINEN TOWEL PACK X5 5464

## (undated) DEVICE — GOWN IMPERVIOUS SPECIALTY XLG/XLONG 32474

## (undated) DEVICE — KIT PATIENT POSITIONING PIGAZZI LATEX FREE 40580

## (undated) DEVICE — SOL WATER IRRIG 3000ML BAG 2B7117

## (undated) DEVICE — KIT SURGICAL TURNOVER FVSD-01D

## (undated) DEVICE — GLOVE PROTEXIS BLUE W/NEU-THERA 6.5  2D73EB65

## (undated) DEVICE — DAVINCI XI GRASPER FENESTRATED TIP UP 8MM 470347

## (undated) DEVICE — DRAPE IOBAN INCISE 23X17" 6650EZ

## (undated) DEVICE — SU ETHIBOND 2-0 SHDA 30" X563H

## (undated) DEVICE — DAVINCI XI MONOPOLAR SCISSORS HOT SHEARS 8MM 470179

## (undated) DEVICE — CATH TRAY FOLEY SURESTEP 16FR WDRAIN BAG STLK LATEX A300316A

## (undated) DEVICE — SU VICRYL 2-0 SH 27" J317H

## (undated) DEVICE — Device

## (undated) DEVICE — SU VICRYL 3-0 SH 27" J316H

## (undated) DEVICE — DRAPE SLEEVE 599

## (undated) DEVICE — SUCTION CANISTER MEDIVAC LINER 3000ML W/LID 65651-530

## (undated) DEVICE — PACK DAVINCI GYN SMA15GDFS1

## (undated) DEVICE — DAVINCI XI ESU FCP BIPOLAR MARYLAND 470172

## (undated) DEVICE — JELLY LUBRICATING SURGILUBE 2OZ TUBE

## (undated) DEVICE — SU VICRYL 4-0 PS-2 18" UND J496H

## (undated) DEVICE — SOL NACL 0.9% IRRIG 1000ML BOTTLE 07138-09

## (undated) DEVICE — ENDO TROCAR CONMED AIRSEAL BLADELESS 08X120MM IAS8-120LP

## (undated) DEVICE — SOL NACL 0.9% INJ 1000ML BAG 2B1324X

## (undated) DEVICE — DAVINCI XI DRAPE COLUMN 470341

## (undated) DEVICE — ESU PENCIL W/HOLSTER E2350H

## (undated) DEVICE — UTERINE MANIPULATOR RUMI TIP SACROCOLPOPEXY DST END SACRO-1C

## (undated) DEVICE — DAVINCI XI SEAL UNIVERSAL 5-8MM 470361

## (undated) DEVICE — SU MONOCRYL 4-0 PS-2 18" UND Y496G

## (undated) DEVICE — GLOVE PROTEXIS W/NEU-THERA 6.0  2D73TE60

## (undated) DEVICE — DAVINCI XI DRAPE ARM 470015

## (undated) DEVICE — GOWN IMPERVIOUS ZONED LG

## (undated) RX ORDER — VECURONIUM BROMIDE 1 MG/ML
INJECTION, POWDER, LYOPHILIZED, FOR SOLUTION INTRAVENOUS
Status: DISPENSED
Start: 2018-12-17

## (undated) RX ORDER — FENTANYL CITRATE 50 UG/ML
INJECTION, SOLUTION INTRAMUSCULAR; INTRAVENOUS
Status: DISPENSED
Start: 2019-09-03

## (undated) RX ORDER — HYDROMORPHONE HYDROCHLORIDE 1 MG/ML
INJECTION, SOLUTION INTRAMUSCULAR; INTRAVENOUS; SUBCUTANEOUS
Status: DISPENSED
Start: 2018-12-17

## (undated) RX ORDER — NEOSTIGMINE METHYLSULFATE 1 MG/ML
VIAL (ML) INJECTION
Status: DISPENSED
Start: 2018-12-17

## (undated) RX ORDER — ONDANSETRON 2 MG/ML
INJECTION INTRAMUSCULAR; INTRAVENOUS
Status: DISPENSED
Start: 2018-12-17

## (undated) RX ORDER — PROPOFOL 10 MG/ML
INJECTION, EMULSION INTRAVENOUS
Status: DISPENSED
Start: 2018-12-17

## (undated) RX ORDER — BUPIVACAINE HYDROCHLORIDE AND EPINEPHRINE 5; 5 MG/ML; UG/ML
INJECTION, SOLUTION EPIDURAL; INTRACAUDAL; PERINEURAL
Status: DISPENSED
Start: 2018-12-17

## (undated) RX ORDER — GLYCOPYRROLATE 0.2 MG/ML
INJECTION, SOLUTION INTRAMUSCULAR; INTRAVENOUS
Status: DISPENSED
Start: 2018-12-17

## (undated) RX ORDER — ACETAMINOPHEN 325 MG/1
TABLET ORAL
Status: DISPENSED
Start: 2018-12-17

## (undated) RX ORDER — PIPERACILLIN SODIUM, TAZOBACTAM SODIUM 3; .375 G/15ML; G/15ML
INJECTION, POWDER, LYOPHILIZED, FOR SOLUTION INTRAVENOUS
Status: DISPENSED
Start: 2018-12-17

## (undated) RX ORDER — DEXAMETHASONE SODIUM PHOSPHATE 4 MG/ML
INJECTION, SOLUTION INTRA-ARTICULAR; INTRALESIONAL; INTRAMUSCULAR; INTRAVENOUS; SOFT TISSUE
Status: DISPENSED
Start: 2018-12-17

## (undated) RX ORDER — FENTANYL CITRATE 50 UG/ML
INJECTION, SOLUTION INTRAMUSCULAR; INTRAVENOUS
Status: DISPENSED
Start: 2018-12-17

## (undated) RX ORDER — KETOROLAC TROMETHAMINE 15 MG/ML
INJECTION, SOLUTION INTRAMUSCULAR; INTRAVENOUS
Status: DISPENSED
Start: 2018-12-17

## (undated) RX ORDER — LIDOCAINE HYDROCHLORIDE 20 MG/ML
INJECTION, SOLUTION EPIDURAL; INFILTRATION; INTRACAUDAL; PERINEURAL
Status: DISPENSED
Start: 2018-12-17